# Patient Record
Sex: MALE | Race: WHITE | URBAN - METROPOLITAN AREA
[De-identification: names, ages, dates, MRNs, and addresses within clinical notes are randomized per-mention and may not be internally consistent; named-entity substitution may affect disease eponyms.]

---

## 2019-10-15 ENCOUNTER — OFFICE VISIT (OUTPATIENT)
Dept: URBAN - METROPOLITAN AREA CLINIC 11 | Facility: CLINIC | Age: 54
End: 2019-10-15
Payer: COMMERCIAL

## 2019-10-15 DIAGNOSIS — H26.493 OTHER SECONDARY CATARACT, BILATERAL: ICD-10-CM

## 2019-10-15 DIAGNOSIS — E11.9 TYPE 2 DIABETES MELLITUS W/O COMPLICATION: Primary | ICD-10-CM

## 2019-10-15 PROCEDURE — 92014 COMPRE OPH EXAM EST PT 1/>: CPT | Performed by: OPTOMETRIST

## 2019-10-15 ASSESSMENT — KERATOMETRY
OD: 44.25
OS: 44.50

## 2019-10-15 ASSESSMENT — INTRAOCULAR PRESSURE
OD: 17
OS: 16

## 2019-10-15 NOTE — IMPRESSION/PLAN
Impression: Type 2 diabetes mellitus w/o complication: C15.3. OU. Plan: No signs of retinopathy or neovascularization noted. Discussed ocular and systemic benefits of blood sugar control.  RTC 1yr complete exam

## 2020-08-02 ENCOUNTER — HOSPITAL ENCOUNTER (EMERGENCY)
Dept: HOSPITAL 94 - ER | Age: 55
LOS: 2 days | Discharge: HOME | End: 2020-08-04
Payer: MEDICAID

## 2020-08-02 VITALS — WEIGHT: 198.42 LBS | HEIGHT: 66 IN | BODY MASS INDEX: 31.89 KG/M2

## 2020-08-02 DIAGNOSIS — Z79.899: ICD-10-CM

## 2020-08-02 DIAGNOSIS — F11.90: ICD-10-CM

## 2020-08-02 DIAGNOSIS — Z59.0: ICD-10-CM

## 2020-08-02 DIAGNOSIS — F20.9: ICD-10-CM

## 2020-08-02 DIAGNOSIS — I10: ICD-10-CM

## 2020-08-02 DIAGNOSIS — F17.200: ICD-10-CM

## 2020-08-02 DIAGNOSIS — F15.90: ICD-10-CM

## 2020-08-02 DIAGNOSIS — J44.9: ICD-10-CM

## 2020-08-02 DIAGNOSIS — R45.851: Primary | ICD-10-CM

## 2020-08-02 DIAGNOSIS — F12.90: ICD-10-CM

## 2020-08-02 DIAGNOSIS — F31.9: ICD-10-CM

## 2020-08-02 LAB
ALBUMIN SERPL BCP-MCNC: 4 G/DL (ref 3.4–5)
ALBUMIN/GLOB SERPL: 1 {RATIO} (ref 1.1–1.5)
ALP SERPL-CCNC: 113 IU/L (ref 46–116)
ALT SERPL W P-5'-P-CCNC: 54 U/L (ref 12–78)
AMORPH URATE CRY #/AREA URNS HPF: (no result) /[HPF]
AMPHETAMINES UR QL SCN: POSITIVE
ANION GAP SERPL CALCULATED.3IONS-SCNC: 14 MMOL/L (ref 8–16)
AST SERPL W P-5'-P-CCNC: 38 U/L (ref 10–37)
BACTERIA URNS QL MICRO: (no result) /HPF
BARBITURATES UR QL SCN: NEGATIVE
BASOPHILS # BLD AUTO: 0 X10'3 (ref 0–0.2)
BASOPHILS NFR BLD AUTO: 0.5 % (ref 0–1)
BENZODIAZ UR QL SCN: NEGATIVE
BILIRUB SERPL-MCNC: 0.7 MG/DL (ref 0.1–1)
BUN SERPL-MCNC: 13 MG/DL (ref 7–18)
BUN/CREAT SERPL: 14.3 (ref 5.4–32)
BZE UR QL SCN: NEGATIVE
CALCIUM SERPL-MCNC: 9.1 MG/DL (ref 8.5–10.1)
CANNABINOIDS UR QL SCN: POSITIVE
CHLORIDE SERPL-SCNC: 102 MMOL/L (ref 99–107)
CLARITY UR: (no result)
CO2 SERPL-SCNC: 23.4 MMOL/L (ref 24–32)
COLOR UR: YELLOW
CREAT SERPL-MCNC: 0.91 MG/DL (ref 0.6–1.1)
DEPRECATED SQUAMOUS URNS QL MICRO: (no result) /LPF
EOSINOPHIL # BLD AUTO: 0 X10'3 (ref 0–0.9)
EOSINOPHIL NFR BLD AUTO: 0.4 % (ref 0–6)
ERYTHROCYTE [DISTWIDTH] IN BLOOD BY AUTOMATED COUNT: 14 % (ref 11.5–14.5)
ETHANOL SERPL-MCNC: < 0.01 GM/DL (ref 0–0.01)
GFR SERPL CREATININE-BSD FRML MDRD: 86 ML/MIN
GLUCOSE SERPL-MCNC: 135 MG/DL (ref 70–104)
GLUCOSE UR STRIP-MCNC: NEGATIVE MG/DL
HCT VFR BLD AUTO: 45.6 % (ref 42–52)
HGB BLD-MCNC: 15.4 G/DL (ref 14–17.9)
HGB UR QL STRIP: (no result)
KETONES UR STRIP-MCNC: (no result) MG/DL
LEUKOCYTE ESTERASE UR QL STRIP: NEGATIVE
LYMPHOCYTES # BLD AUTO: 1.1 X10'3 (ref 1.1–4.8)
LYMPHOCYTES NFR BLD AUTO: 14.1 % (ref 21–51)
MCH RBC QN AUTO: 31.4 PG (ref 27–31)
MCHC RBC AUTO-ENTMCNC: 33.7 G/DL (ref 33–36.5)
MCV RBC AUTO: 93 FL (ref 78–98)
METHADONE UR QL SCN: NEGATIVE
MONOCYTES # BLD AUTO: 0.8 X10'3 (ref 0–0.9)
MONOCYTES NFR BLD AUTO: 10.2 % (ref 2–12)
MUCOUS THREADS URNS QL MICRO: (no result) /LPF
NEUTROPHILS # BLD AUTO: 5.9 X10'3 (ref 1.8–7.7)
NEUTROPHILS NFR BLD AUTO: 74.8 % (ref 42–75)
NITRITE UR QL STRIP: NEGATIVE
OPIATES UR QL SCN: NEGATIVE
PCP UR QL SCN: NEGATIVE
PH UR STRIP: 5.5 [PH] (ref 4.8–8)
PLATELET # BLD AUTO: 254 X10'3 (ref 140–440)
PMV BLD AUTO: 8 FL (ref 7.4–10.4)
POTASSIUM SERPL-SCNC: 3.2 MMOL/L (ref 3.5–5.1)
PROT SERPL-MCNC: 8.2 G/DL (ref 6.4–8.2)
PROT UR QL STRIP: 30 MG/DL
RBC # BLD AUTO: 4.9 X10'6 (ref 4.7–6.1)
RBC #/AREA URNS HPF: (no result) /HPF (ref 0–2)
SODIUM SERPL-SCNC: 139 MMOL/L (ref 135–145)
SP GR UR STRIP: >=1.03 (ref 1–1.03)
URN COLLECT METHOD CLASS: (no result)
UROBILINOGEN UR STRIP-MCNC: 0.2 E.U/DL (ref 0.2–1)
WBC # BLD AUTO: 7.9 X10'3 (ref 4.5–11)
WBC #/AREA URNS HPF: (no result) /HPF (ref 0–4)

## 2020-08-02 PROCEDURE — 84439 ASSAY OF FREE THYROXINE: CPT

## 2020-08-02 PROCEDURE — 80048 BASIC METABOLIC PNL TOTAL CA: CPT

## 2020-08-02 PROCEDURE — 93005 ELECTROCARDIOGRAM TRACING: CPT

## 2020-08-02 PROCEDURE — 81001 URINALYSIS AUTO W/SCOPE: CPT

## 2020-08-02 PROCEDURE — 94640 AIRWAY INHALATION TREATMENT: CPT

## 2020-08-02 PROCEDURE — 99285 EMERGENCY DEPT VISIT HI MDM: CPT

## 2020-08-02 PROCEDURE — 85025 COMPLETE CBC W/AUTO DIFF WBC: CPT

## 2020-08-02 PROCEDURE — 82948 REAGENT STRIP/BLOOD GLUCOSE: CPT

## 2020-08-02 PROCEDURE — 80053 COMPREHEN METABOLIC PANEL: CPT

## 2020-08-02 PROCEDURE — 80305 DRUG TEST PRSMV DIR OPT OBS: CPT

## 2020-08-02 PROCEDURE — 94760 N-INVAS EAR/PLS OXIMETRY 1: CPT

## 2020-08-02 PROCEDURE — 84443 ASSAY THYROID STIM HORMONE: CPT

## 2020-08-02 PROCEDURE — 80320 DRUG SCREEN QUANTALCOHOLS: CPT

## 2020-08-02 PROCEDURE — 36415 COLL VENOUS BLD VENIPUNCTURE: CPT

## 2020-08-02 SDOH — ECONOMIC STABILITY - HOUSING INSECURITY: HOMELESSNESS: Z59.0

## 2020-08-02 NOTE — NUR
PT REPORTS HE IS FEELING ANXIOUS AND SCARED . PT HR  RESP 18 /90 O2 
SAT 98 % ROOM  AIR . PT STATES HE HAS FEAR STILL THAT SOMEONE IS GOING TO KILL 
HIM . PT POTASSIUM IS AT 3.2 . NOTIFIED DR PETER OF K VALUE AND PT'S CURRENT 
SET OF VSS AND URINE TOXC SCREEN RESULTS . ORDER GIVEN FOR 60 MEQ OF KCL PO AND 
SERAQUEL 50 MG PO X1 NOW .

## 2020-08-02 NOTE — NUR
PT BROUGHT OVER WITH ER TECH FROM MAIN ER TO BE PLACED IN BED 23 . PT IN GREEN 
SCRUBS, BELONGING REPORT COMPLETE, MED REC DONE .

## 2020-08-02 NOTE — NUR
PT REPORTS HE IS A SMOKER . SMOKES 1/2 A PACK A DAY AND IS REQUESTING A 
NICOTINE PATCH . PHONED CHARGE NURSE IN MAIN ER TO GET AN ORDER FROM MD PETER 
FOR  NICOTINE PATCH

## 2020-08-02 NOTE — NUR
PT ORIENTED TO UNIT DISCUSSED PLAN OF CARE . PT CONTRACTED FOR SAFETY . 
VERBALIZED HE WILL NOT HURT HIMSELF , BUT IS HEARING VOICES . REASSURED PT THAT 
HE IS SAFE AND THAT WE WILL KEEP HIM THAT WAY . PT VERBALIZED " THANK YOU " PT 
UP OUT OF BED TO BATHROOM WITH STEADY GAIT FOR URINE SPECIAMAN

## 2020-08-03 VITALS — DIASTOLIC BLOOD PRESSURE: 83 MMHG | SYSTOLIC BLOOD PRESSURE: 138 MMHG

## 2020-08-03 LAB
ALBUMIN SERPL BCP-MCNC: 3.5 G/DL (ref 3.4–5)
ANION GAP SERPL CALCULATED.3IONS-SCNC: 8 MMOL/L (ref 8–16)
BUN SERPL-MCNC: 13 MG/DL (ref 7–18)
BUN/CREAT SERPL: 14.4 (ref 5.4–32)
CALCIUM SERPL-MCNC: 8.8 MG/DL (ref 8.5–10.1)
CHLORIDE SERPL-SCNC: 105 MMOL/L (ref 99–107)
CO2 SERPL-SCNC: 26.6 MMOL/L (ref 24–32)
CREAT SERPL-MCNC: 0.9 MG/DL (ref 0.6–1.1)
GFR SERPL CREATININE-BSD FRML MDRD: 87 ML/MIN
GLUCOSE SERPL-MCNC: 176 MG/DL (ref 70–104)
POTASSIUM SERPL-SCNC: 3.6 MMOL/L (ref 3.5–5.1)
SODIUM SERPL-SCNC: 140 MMOL/L (ref 135–145)

## 2020-08-03 RX ADMIN — BUDESONIDE SCH MG: 0.5 INHALANT RESPIRATORY (INHALATION) at 18:52

## 2020-08-03 RX ADMIN — BUDESONIDE SCH MG: 0.5 INHALANT RESPIRATORY (INHALATION) at 08:00

## 2020-08-03 RX ADMIN — IPRATROPIUM BROMIDE AND ALBUTEROL SULFATE PRN ML: .5; 3 SOLUTION RESPIRATORY (INHALATION) at 18:52

## 2020-08-03 NOTE — NUR
PT SLEEPING PEACFULLY SUPINE IN BED . HOB ELEVATED 30 DEGREES . RESP EVEN AND 
UNLABORED. WILL CONTINUE TO MONITOR AND REASSESS

## 2020-08-03 NOTE — NUR
PT AWOKEN WITH VITAL SIGNS . ASKING FOR FOOD . PT REFUSED TURKEY SANDWITCH LAST 
NIGHT WHEN OFFERED , BUT STATES HE IS NOW HUNGRY . HEART RATE AT 92 , INPROVED 
FROM A PREVIOUS RATES -120 ON ADMISSION . BROUGHT PT A TURKEY SANDWITCH . 
PLAN OF CAR UPDATED , PT IS AWARE HE HAS AN AM BLOOD DRAW TO RECHECK HIS 
POTASSIUM LEVEL . PATIENT verbalized " I WOULD LIKE TO WAIT ON MY BLOOD DRAW 
UNTIL I AM FINISHED EATING. " REASSURED PATIENT THAT HE WILL BE ABLE TO FINISH 
HIS MEAL PRIOR TO HIS BLOOD BEING DRAWN . PT COROPORATIVE AND CALM AT THIS TIME 
.

## 2020-08-03 NOTE — NUR
Pt. placed on 5150 by Jamie from [facility] for DTS. Pt. continues to have active 
thoughts of killing himself and has plan to "cut himself". Pt. is depressed, 
and suffering from paranoia. Pt. does not feel he is safe to be by himself.

## 2020-08-03 NOTE — NUR
Pt. c/o of bilateral leg pain rated 6/10. Pt. reports he has taken gabapentin 
for this in the past but it has been about a month since last taking it. Pt. 
reports diabetes in his past which has been diet controlled. Pt.'s diet changed 
to carbohydrate controlled. Pt. started on gabapentin 300mg po q HS.

## 2020-08-03 NOTE — NUR
PT SLEEPING PEACFULLY SUPINE IN BED WITH HOB ELEVATED 30 DEGREES . RESP EVEN 
AND UNLABORED . WILL CONTINUE TO MONITOR AND REASSESS AS NEEDED

## 2020-08-03 NOTE — NUR
P/C from Isac from [facility]. They will be sending packet to [facility] of acceptable placement locations for patient. They don't have a 
contract w/[facility], or other local placement agencies, so they will request [facility] find placement for him at one of their contracted providers first.

## 2020-08-03 NOTE — NUR
Pt was sitting in bed eating his dinner at change of shift. Appetite is good pt 
ate 100% of his meal. Pt states he continues to be suicidal. Pt shows me his 
wrists and states "Im a cutter so I would do that." Pt reports he continues to 
hear voices, but they have improved since he has arrived and he doesnt know 
what they are saying. Respiratory is currently at his bed side, pt c/o SOB. Pt 
c/o constipation after dinner but just reported a large BM. Pt is currently 
soaking his feet to clean his feet while awaiting respiratory tx.

## 2020-08-03 NOTE — NUR
PT SLEEPING PEACFULLY SUPINE IN BED . HOB ELEVATED 30 DEGREES . RESP EVEN AND 
UNLABORED .will continue to monitor and reassess

## 2020-08-03 NOTE — NUR
PT RESTING COMFORTBALY IN BED WITH HIS EYES CLOSED . PT APPEARS TO ALMOST BE 
ASLEEP . WILL CONTINUE TO REASSESS AS NEEDED . RESP EVEN AND UINLABORED

## 2020-08-03 NOTE — NUR
-------------------------------------------------------------------------------

          *** Note undone in [facility] - 08/03/20 at 0600 by JARON ***           

-------------------------------------------------------------------------------

LAZARO ESCOBAR, BY ER TECH ,LILIBETH TO [facility]

## 2020-08-03 NOTE — NUR
1:1 done at bedside. Pt. reports SI with plan to cut his wrists. Pt. has 
multiple previous suicide attempts by cutting his wrists. Pt. reports + 
auditory hallucinations, pt. states, "But they are real! Everytime I use 
(drugs) they are coming after me. I stayed in a motel for 4 hours yesterday and 
I could hear them outside my room... [facility]... 
They are out to get me".

## 2020-08-03 NOTE — NUR
Pt. asleep in bed laying on left side. Normal R&R of respirations noted. Pt. is 
in no apparent distress.

## 2020-08-03 NOTE — NUR
Pt is resting comfortably in bed, woke for scheduled meds. Pt is smiling and 
pleasant, cooperative. Pt is med compliant. Pt is laying on his back, RR 16 
even and unlabored.

## 2020-08-04 RX ADMIN — IPRATROPIUM BROMIDE AND ALBUTEROL SULFATE PRN ML: .5; 3 SOLUTION RESPIRATORY (INHALATION) at 08:14

## 2020-08-04 RX ADMIN — BUDESONIDE SCH MG: 0.5 INHALANT RESPIRATORY (INHALATION) at 08:11

## 2020-08-04 NOTE — NUR
middle of night, 5150, SI, from [facility].  nurse states he didn't talk to 
patient last night because he sleeping.  Gely Salinas